# Patient Record
Sex: MALE | Race: BLACK OR AFRICAN AMERICAN | Employment: FULL TIME | URBAN - METROPOLITAN AREA
[De-identification: names, ages, dates, MRNs, and addresses within clinical notes are randomized per-mention and may not be internally consistent; named-entity substitution may affect disease eponyms.]

---

## 2018-05-22 ENCOUNTER — OFFICE VISIT (OUTPATIENT)
Dept: FAMILY MEDICINE CLINIC | Age: 43
End: 2018-05-22

## 2018-05-22 VITALS
HEART RATE: 76 BPM | WEIGHT: 253 LBS | BODY MASS INDEX: 33.53 KG/M2 | DIASTOLIC BLOOD PRESSURE: 84 MMHG | HEIGHT: 73 IN | SYSTOLIC BLOOD PRESSURE: 118 MMHG

## 2018-05-22 DIAGNOSIS — Z13.220 SCREENING CHOLESTEROL LEVEL: ICD-10-CM

## 2018-05-22 DIAGNOSIS — Z00.00 WELL ADULT EXAM: Primary | ICD-10-CM

## 2018-05-22 DIAGNOSIS — M79.675 GREAT TOE PAIN, LEFT: ICD-10-CM

## 2018-05-22 DIAGNOSIS — E66.09 CLASS 1 OBESITY DUE TO EXCESS CALORIES WITHOUT SERIOUS COMORBIDITY WITH BODY MASS INDEX (BMI) OF 33.0 TO 33.9 IN ADULT: ICD-10-CM

## 2018-05-22 DIAGNOSIS — M54.31 SCIATICA OF RIGHT SIDE: ICD-10-CM

## 2018-05-22 PROBLEM — E66.811 CLASS 1 OBESITY DUE TO EXCESS CALORIES WITHOUT SERIOUS COMORBIDITY WITH BODY MASS INDEX (BMI) OF 33.0 TO 33.9 IN ADULT: Status: ACTIVE | Noted: 2018-05-22

## 2018-05-22 LAB
A/G RATIO: 1.7 (ref 1.1–2.2)
ALBUMIN SERPL-MCNC: 4.8 G/DL (ref 3.4–5)
ALP BLD-CCNC: 80 U/L (ref 40–129)
ALT SERPL-CCNC: 17 U/L (ref 10–40)
ANION GAP SERPL CALCULATED.3IONS-SCNC: 21 MMOL/L (ref 3–16)
AST SERPL-CCNC: 20 U/L (ref 15–37)
BILIRUB SERPL-MCNC: 0.4 MG/DL (ref 0–1)
BUN BLDV-MCNC: 11 MG/DL (ref 7–20)
CALCIUM SERPL-MCNC: 9.5 MG/DL (ref 8.3–10.6)
CHLORIDE BLD-SCNC: 97 MMOL/L (ref 99–110)
CHOLESTEROL, TOTAL: 299 MG/DL (ref 0–199)
CO2: 23 MMOL/L (ref 21–32)
CREAT SERPL-MCNC: 1.1 MG/DL (ref 0.9–1.3)
GFR AFRICAN AMERICAN: >60
GFR NON-AFRICAN AMERICAN: >60
GLOBULIN: 2.8 G/DL
GLUCOSE BLD-MCNC: 68 MG/DL (ref 70–99)
HDLC SERPL-MCNC: 42 MG/DL (ref 40–60)
LDL CHOLESTEROL CALCULATED: ABNORMAL MG/DL
LDL CHOLESTEROL DIRECT: 190 MG/DL
POTASSIUM SERPL-SCNC: 4 MMOL/L (ref 3.5–5.1)
SODIUM BLD-SCNC: 141 MMOL/L (ref 136–145)
TOTAL PROTEIN: 7.6 G/DL (ref 6.4–8.2)
TRIGL SERPL-MCNC: 430 MG/DL (ref 0–150)
URIC ACID, SERUM: 6.7 MG/DL (ref 3.5–7.2)
VLDLC SERPL CALC-MCNC: ABNORMAL MG/DL

## 2018-05-22 PROCEDURE — 36415 COLL VENOUS BLD VENIPUNCTURE: CPT | Performed by: FAMILY MEDICINE

## 2018-05-22 PROCEDURE — 99386 PREV VISIT NEW AGE 40-64: CPT | Performed by: FAMILY MEDICINE

## 2018-05-22 RX ORDER — CYCLOBENZAPRINE HCL 10 MG
10 TABLET ORAL 3 TIMES DAILY PRN
Qty: 20 TABLET | Refills: 0 | Status: CANCELLED | OUTPATIENT
Start: 2018-05-22 | End: 2018-06-01

## 2018-05-22 RX ORDER — METHYLPREDNISOLONE 4 MG/1
TABLET ORAL
Qty: 21 TABLET | Refills: 0 | Status: SHIPPED | OUTPATIENT
Start: 2018-05-22 | End: 2018-05-28

## 2018-05-22 RX ORDER — CYCLOBENZAPRINE HCL 10 MG
10 TABLET ORAL 3 TIMES DAILY PRN
Qty: 20 TABLET | Refills: 0 | Status: SHIPPED | OUTPATIENT
Start: 2018-05-22 | End: 2018-06-01

## 2018-05-22 ASSESSMENT — ENCOUNTER SYMPTOMS
RHINORRHEA: 0
SHORTNESS OF BREATH: 0
EYE PAIN: 0
COUGH: 0
ABDOMINAL PAIN: 0
COLOR CHANGE: 0
SORE THROAT: 0
VOMITING: 0
NAUSEA: 0
DIARRHEA: 0
CONSTIPATION: 0

## 2018-05-22 ASSESSMENT — PATIENT HEALTH QUESTIONNAIRE - PHQ9
1. LITTLE INTEREST OR PLEASURE IN DOING THINGS: 0
2. FEELING DOWN, DEPRESSED OR HOPELESS: 0
SUM OF ALL RESPONSES TO PHQ9 QUESTIONS 1 & 2: 0
SUM OF ALL RESPONSES TO PHQ QUESTIONS 1-9: 0

## 2019-08-02 ENCOUNTER — OFFICE VISIT (OUTPATIENT)
Dept: FAMILY MEDICINE CLINIC | Age: 44
End: 2019-08-02
Payer: COMMERCIAL

## 2019-08-02 VITALS
HEART RATE: 73 BPM | SYSTOLIC BLOOD PRESSURE: 128 MMHG | WEIGHT: 255 LBS | OXYGEN SATURATION: 98 % | BODY MASS INDEX: 33.8 KG/M2 | HEIGHT: 73 IN | DIASTOLIC BLOOD PRESSURE: 76 MMHG

## 2019-08-02 DIAGNOSIS — M54.50 ACUTE LEFT-SIDED LOW BACK PAIN WITHOUT SCIATICA: Primary | ICD-10-CM

## 2019-08-02 PROCEDURE — 99213 OFFICE O/P EST LOW 20 MIN: CPT | Performed by: FAMILY MEDICINE

## 2019-08-02 RX ORDER — PREDNISONE 10 MG/1
10 TABLET ORAL 2 TIMES DAILY
Qty: 10 TABLET | Refills: 0 | Status: SHIPPED | OUTPATIENT
Start: 2019-08-02 | End: 2019-08-07

## 2019-08-02 RX ORDER — CYCLOBENZAPRINE HCL 10 MG
10 TABLET ORAL 3 TIMES DAILY PRN
Qty: 15 TABLET | Refills: 1 | Status: SHIPPED | OUTPATIENT
Start: 2019-08-02 | End: 2019-08-12

## 2019-08-02 ASSESSMENT — PATIENT HEALTH QUESTIONNAIRE - PHQ9
SUM OF ALL RESPONSES TO PHQ QUESTIONS 1-9: 0
SUM OF ALL RESPONSES TO PHQ9 QUESTIONS 1 & 2: 0
2. FEELING DOWN, DEPRESSED OR HOPELESS: 0
SUM OF ALL RESPONSES TO PHQ QUESTIONS 1-9: 0
1. LITTLE INTEREST OR PLEASURE IN DOING THINGS: 0

## 2020-01-31 ENCOUNTER — TELEPHONE (OUTPATIENT)
Dept: FAMILY MEDICINE CLINIC | Age: 45
End: 2020-01-31

## 2020-01-31 NOTE — TELEPHONE ENCOUNTER
Samara from [facility]. [facility] 's calling about this patients medical records. The form is scanned in media.     Please fax them to  [phone]    Or call Samara for any questions 21 [phone]   ext: [phone]

## 2020-08-27 NOTE — PROGRESS NOTES
Chief Complaint   Patient presents with    Annual Exam     Family History   Problem Relation Age of Onset    Heart Defect Mother 39     Social History     Socioeconomic History    Marital status:      Spouse name: Jc Douglas Number of children: 2    Years of education: Not on file    Highest education level: Not on file   Occupational History    Occupation:      Employer: [employer]. Social Needs    Financial resource strain: Not on file    Food insecurity     Worry: Not on file     Inability: Not on file    Transportation needs     Medical: Not on file     Non-medical: Not on file   Tobacco Use    Smoking status: Never Smoker    Smokeless tobacco: Never Used   Substance and Sexual Activity    Alcohol use: No    Drug use: No    Sexual activity: Yes     Partners: Female   Lifestyle    Physical activity     Days per week: Not on file     Minutes per session: Not on file    Stress: Not on file   Relationships    Social connections     Talks on phone: Not on file     Gets together: Not on file     Attends Jewish service: Not on file     Active member of club or organization: Not on file     Attends meetings of clubs or organizations: Not on file     Relationship status: Not on file    Intimate partner violence     Fear of current or ex partner: Not on file     Emotionally abused: Not on file     Physically abused: Not on file     Forced sexual activity: Not on file   Other Topics Concern    Not on file   Social History Narrative    Not on file     No current outpatient medications on file.   No Known Allergies    Patient Active Problem List   Diagnosis    Class 1 obesity due to excess calories without serious comorbidity with body mass index (BMI) of 33.0 to 33.9 in adult       HPI / ROS: Griffin Nunes presents for evaluation and management of :    # Preventive and other issues  # Lipids - recent screening history:  Lab Results   Component Value Date    LDLCALC see below 05/22/2018     Lab Results   Component Value Date    TRIG 430 (H) 05/22/2018     Lab Results   Component Value Date    HDL 42 05/22/2018     Lab Results   Component Value Date    CHOL 299 (H) 05/22/2018     Lab Results   Component Value Date    LABA1C 5.8 08/28/2020     No results found for: EAG    # PSA screening history:  No results found for: PSA, PSADIA     # High TGs screen diabetes    # Obesity reviewed with pt lower carb diet with carb targets to help with insulin effects on weight storage; resources advised\        Wt Readings from Last 3 Encounters:   08/28/20 256 lb (116.1 kg)   08/02/19 255 lb (115.7 kg)   05/22/18 253 lb (114.8 kg)         A&o  /83   Pulse 75   Resp 15   Ht 6' 1\" (1.854 m)   Wt 256 lb (116.1 kg)   SpO2 95%   BMI 33.78 kg/m²   Neck no bruit no TMg  Car reg no MGR  Lungs cta  Ext no edema       Diagnosis Orders   1. Routine general medical examination at a health care facility     2. Screening, lipid  Lipid Panel   3. Screening for malignant neoplasm of prostate  Psa screening   4.  Hypertriglyceridemia  POCT glycosylated hemoglobin (Hb A1C)    a1c r/o DM     Orders Placed This Encounter   Procedures    Lipid Panel     Order Specific Question:   Is Patient Fasting?/# of Hours     Answer:   yes    Psa screening    POCT glycosylated hemoglobin (Hb A1C)

## 2020-08-28 ENCOUNTER — OFFICE VISIT (OUTPATIENT)
Dept: FAMILY MEDICINE CLINIC | Age: 45
End: 2020-08-28
Payer: COMMERCIAL

## 2020-08-28 ENCOUNTER — TELEPHONE (OUTPATIENT)
Dept: FAMILY MEDICINE CLINIC | Age: 45
End: 2020-08-28

## 2020-08-28 VITALS
HEIGHT: 73 IN | SYSTOLIC BLOOD PRESSURE: 121 MMHG | BODY MASS INDEX: 33.93 KG/M2 | OXYGEN SATURATION: 95 % | DIASTOLIC BLOOD PRESSURE: 83 MMHG | RESPIRATION RATE: 15 BRPM | WEIGHT: 256 LBS | HEART RATE: 75 BPM

## 2020-08-28 LAB
CHOLESTEROL, TOTAL: 256 MG/DL (ref 0–199)
HBA1C MFR BLD: 5.8 %
HDLC SERPL-MCNC: 37 MG/DL (ref 40–60)
LDL CHOLESTEROL CALCULATED: 184 MG/DL
PROSTATE SPECIFIC ANTIGEN: 0.46 NG/ML (ref 0–4)
TRIGL SERPL-MCNC: 174 MG/DL (ref 0–150)
VLDLC SERPL CALC-MCNC: 35 MG/DL

## 2020-08-28 PROCEDURE — 99396 PREV VISIT EST AGE 40-64: CPT | Performed by: FAMILY MEDICINE

## 2020-08-28 PROCEDURE — 83036 HEMOGLOBIN GLYCOSYLATED A1C: CPT | Performed by: FAMILY MEDICINE

## 2020-08-28 PROCEDURE — 36415 COLL VENOUS BLD VENIPUNCTURE: CPT | Performed by: FAMILY MEDICINE

## 2020-08-28 NOTE — PATIENT INSTRUCTIONS
1) Wheat Belly by Brain Fernandez MD ([email or web address])  2) Living Paleo For Dummies  3) The Primal Blueprint by Juany Jenkins    Www. Pin digital - review success stories    target < 100 grams of carb daily; ZERO grained based carbs    Additional Tip :  Intermittent Fasting / Eating Window  Only eat between noon and 7 PM

## 2020-11-09 ENCOUNTER — VIRTUAL VISIT (OUTPATIENT)
Dept: FAMILY MEDICINE CLINIC | Age: 45
End: 2020-11-09
Payer: COMMERCIAL

## 2020-11-09 ENCOUNTER — OFFICE VISIT (OUTPATIENT)
Dept: PRIMARY CARE CLINIC | Age: 45
End: 2020-11-09
Payer: COMMERCIAL

## 2020-11-09 PROCEDURE — 99213 OFFICE O/P EST LOW 20 MIN: CPT | Performed by: FAMILY MEDICINE

## 2020-11-09 PROCEDURE — 99211 OFF/OP EST MAY X REQ PHY/QHP: CPT | Performed by: NURSE PRACTITIONER

## 2020-11-09 NOTE — PROGRESS NOTES
Deon Quinonez received a viral test for COVID-19. They were educated on isolation and quarantine as appropriate. For any symptoms, they were directed to seek care from their PCP, given contact information to establish with a doctor, directed to an urgent care or the emergency room.

## 2020-11-09 NOTE — PROGRESS NOTES
TELEHEALTH EVALUATION -- Audio/Visual (During HSURD-52 public health emergency)    Helena Maya is a 54 y.o. male being evaluated by a Virtual Visit (video visit) encounter to address concerns as mentioned above. A caregiver was present when appropriate. Due to this being a TeleHealth encounter (During MULJP-19 public health emergency), evaluation of the following organ systems was limited: Vitals/Constitutional/EENT/Resp/CV/GI//MS/Neuro/Skin/Heme-Lymph-Imm. Pursuant to the emergency declaration under the [facility], [address] [place] [address] authority and the [employer] and [employer] Act, this Virtual Visit was conducted with patient's (and/or legal guardian's) consent, to reduce the patient's risk of exposure to COVID-19 and provide necessary medical care. The patient (and/or legal guardian) has also been advised to contact this office for worsening conditions or problems, and seek emergency medical treatment and/or call 911 if deemed necessary. Services were provided through a video synchronous discussion virtually to substitute for in-person clinic visit. Patient and provider were located at their individual homes. --Carrillo Velasco MD on 11/9/2020 at 11:26 AM    An electronic signature was used to authenticate this note. No chief complaint on file. Family History   Problem Relation Age of Onset    Heart Defect Mother 39     Social History     Socioeconomic History    Marital status:      Spouse name: Gemini Rodriguez Number of children: 2    Years of education: Not on file    Highest education level: Not on file   Occupational History    Occupation:      Employer: [employer].    Social Needs    Financial resource strain: Not on file    Food insecurity     Worry: Not on file     Inability: Not on file    Transportation needs     Medical: Not on file     Non-medical: Not on file   Tobacco Use    Smoking status: Never Smoker    Smokeless tobacco: Never Used   Substance and Sexual Activity    Alcohol use: No    Drug use: No    Sexual activity: Yes     Partners: Female   Lifestyle    Physical activity     Days per week: Not on file     Minutes per session: Not on file    Stress: Not on file   Relationships    Social connections     Talks on phone: Not on file     Gets together: Not on file     Attends Religion service: Not on file     Active member of club or organization: Not on file     Attends meetings of clubs or organizations: Not on file     Relationship status: Not on file    Intimate partner violence     Fear of current or ex partner: Not on file     Emotionally abused: Not on file     Physically abused: Not on file     Forced sexual activity: Not on file   Other Topics Concern    Not on file   Social History Narrative    Not on file     No current outpatient medications on file. No Known Allergies    Patient Active Problem List   Diagnosis    Class 1 obesity due to excess calories without serious comorbidity with body mass index (BMI) of 33.0 to 33.9 in adult       HPI / ROS: Alan Lal presents for evaluation and management of :    # 2 day ago had chills and sweats and had pounding headache. Still has those symptoms. Has lost sense of smell and taste. Tempt no fever. Has cough. Not SOB. \"Just feels bad\"      Wt Readings from Last 3 Encounters:   08/28/20 256 lb (116.1 kg)   08/02/19 255 lb (115.7 kg)   05/22/18 253 lb (114.8 kg)       PE : virtual visit     Diagnosis Orders   1. Suspected COVID-19 virus infection      reviewed expecvtations and need t test he will go to viral clinci call for worsening     No orders of the defined types were placed in this encounter.

## 2020-11-11 ENCOUNTER — TELEPHONE (OUTPATIENT)
Dept: FAMILY MEDICINE CLINIC | Age: 45
End: 2020-11-11

## 2020-11-11 LAB — SARS-COV-2, NAA: DETECTED

## 2020-11-11 NOTE — TELEPHONE ENCOUNTER
Let pt know Dr. Tom Savage is out today, but we will leave message for him tomorrow to see where he can be fit in with Dr. Tom Savage.     This document but keep encounter open so Dr. Tom Savage sees us tomorrow

## 2020-11-11 NOTE — TELEPHONE ENCOUNTER
PT informed per Dr Guido Espinoza    PT states that symptoms have been going on for 3-4 days. Pain intensifies at night. PT states tylenol and ibuprofen is not helping.  Anything PT can try today/tonight until Dr Tia Sheriff is back in the office

## 2020-11-11 NOTE — TELEPHONE ENCOUNTER
PT called in stating that he has been experiencing pain in his back. He says that if he lays on his stomach then he gets a burning sensation in stomach that travels to his back once he tries to sleep on his back and he says it feels like a tightening feeling in his back, not a burning sensation that he gets in his stomach when he tries to lay on his stomach.      Please call PT back: [phone]

## 2020-11-12 NOTE — TELEPHONE ENCOUNTER
PT called back in regarding message. Informed PT of Dr. Thelma Modi note. He says he will try the Omeprazole first and if need be he will schedule a VV for a different day.

## 2020-11-30 ENCOUNTER — TELEPHONE (OUTPATIENT)
Dept: FAMILY MEDICINE CLINIC | Age: 45
End: 2020-11-30

## 2020-11-30 NOTE — TELEPHONE ENCOUNTER
PT called in stating that he tested positive for COVID on 11/14/2020 and he's been on short term disability in the meantime. Pt says that he's still not feeling completely better so he wanted to discuss about his short term disability paperwork and when he is able to return to work.      Please call PT back: [phone]

## 2020-11-30 NOTE — TELEPHONE ENCOUNTER
PT called back in regarding message. PT is scheduled for a VV with Dr. Ileana Parks next Wednesday 12/9 @ 8:45.

## 2020-12-01 ENCOUNTER — VIRTUAL VISIT (OUTPATIENT)
Dept: FAMILY MEDICINE CLINIC | Age: 45
End: 2020-12-01
Payer: COMMERCIAL

## 2020-12-01 PROCEDURE — 99213 OFFICE O/P EST LOW 20 MIN: CPT | Performed by: FAMILY MEDICINE

## 2020-12-01 NOTE — PROGRESS NOTES
TELEHEALTH EVALUATION -- Audio/Visual (During [facility])    Dharmesh Low is a 54 y.o. male being evaluated by a Virtual Visit (video visit) encounter to address concerns as mentioned above. A caregiver was present when appropriate. Due to this being a TeleHealth encounter (During [facility] public health emergency), evaluation of the following organ systems was limited: Vitals/Constitutional/EENT/Resp/CV/GI//MS/Neuro/Skin/Heme-Lymph-Imm. Pursuant to the emergency declaration under the [address] [place] [address], [address] [place] [address] authority and the [employer] and [employer] Act, this Virtual Visit was conducted with patient's (and/or legal guardian's) consent, to reduce the patient's risk of exposure to COVID-19 and provide necessary medical care. The patient (and/or legal guardian) has also been advised to contact this office for worsening conditions or problems, and seek emergency medical treatment and/or call 911 if deemed necessary. Services were provided through a video synchronous discussion virtually to substitute for in-person clinic visit. Patient and provider were located at their individual homes. --Ofelia Sosa MD on 12/1/2020 at 10:19 AM    An electronic signature was used to authenticate this note. No chief complaint on file. Family History   Problem Relation Age of Onset    Heart Defect Mother 39     Social History     Socioeconomic History    Marital status:      Spouse name: Peter Maria Number of children: 2    Years of education: Not on file    Highest education level: Not on file   Occupational History    Occupation:      Employer: [employer].    Social Needs    Financial resource strain: Not on file    Food insecurity     Worry: Not on file     Inability: Not on file    Transportation needs     Medical: Not on file     Non-medical: Not on file   Tobacco Use    Smoking status: Never Smoker    Smokeless tobacco: Never Used   Substance and Sexual Activity    Alcohol use: No    Drug use: No    Sexual activity: Yes     Partners: Female   Lifestyle    Physical activity     Days per week: Not on file     Minutes per session: Not on file    Stress: Not on file   Relationships    Social connections     Talks on phone: Not on file     Gets together: Not on file     Attends Restorationist service: Not on file     Active member of club or organization: Not on file     Attends meetings of clubs or organizations: Not on file     Relationship status: Not on file    Intimate partner violence     Fear of current or ex partner: Not on file     Emotionally abused: Not on file     Physically abused: Not on file     Forced sexual activity: Not on file   Other Topics Concern    Not on file   Social History Narrative    Not on file     No current outpatient medications on file. No Known Allergies    Patient Active Problem List   Diagnosis    Class 1 obesity due to excess calories without serious comorbidity with body mass index (BMI) of 33.0 to 33.9 in adult       HPI / ROS: Jacky Hennessy presents for evaluation and management of :    # COVID recovery - he feels fine just off and on taste and smell. No more fever and feeling better just intermittent smell/tatse which may take awhile. Still some fatigue still. Wt Readings from Last 3 Encounters:   08/28/20 256 lb (116.1 kg)   08/02/19 255 lb (115.7 kg)   05/22/18 253 lb (114.8 kg)       PE : virtual visit     Diagnosis Orders   1. COVID-19 virus infection      OK to return to work full duties next week. Rough course. Add more walking 30 minutes /day - get strength back. Letter provided. No orders of the defined types were placed in this encounter.

## 2020-12-16 ENCOUNTER — TELEPHONE (OUTPATIENT)
Dept: FAMILY MEDICINE CLINIC | Age: 45
End: 2020-12-16

## 2020-12-16 NOTE — TELEPHONE ENCOUNTER
[employer] called stating that they only received the fax cover sheet regarding PT's short term disability. Please refax to: [phone].     Best call back number: [phone] ext: [phone]

## 2021-12-29 ENCOUNTER — OFFICE VISIT (OUTPATIENT)
Dept: FAMILY MEDICINE CLINIC | Age: 46
End: 2021-12-29
Payer: COMMERCIAL

## 2021-12-29 VITALS
HEIGHT: 73 IN | BODY MASS INDEX: 35.63 KG/M2 | HEART RATE: 80 BPM | WEIGHT: 268.8 LBS | DIASTOLIC BLOOD PRESSURE: 86 MMHG | OXYGEN SATURATION: 98 % | SYSTOLIC BLOOD PRESSURE: 132 MMHG | RESPIRATION RATE: 16 BRPM

## 2021-12-29 DIAGNOSIS — R10.13 EPIGASTRIC PAIN: Primary | ICD-10-CM

## 2021-12-29 PROCEDURE — 93000 ELECTROCARDIOGRAM COMPLETE: CPT | Performed by: FAMILY MEDICINE

## 2021-12-29 PROCEDURE — 99213 OFFICE O/P EST LOW 20 MIN: CPT | Performed by: FAMILY MEDICINE

## 2021-12-29 SDOH — ECONOMIC STABILITY: FOOD INSECURITY: WITHIN THE PAST 12 MONTHS, THE FOOD YOU BOUGHT JUST DIDN'T LAST AND YOU DIDN'T HAVE MONEY TO GET MORE.: NEVER TRUE

## 2021-12-29 SDOH — ECONOMIC STABILITY: FOOD INSECURITY: WITHIN THE PAST 12 MONTHS, YOU WORRIED THAT YOUR FOOD WOULD RUN OUT BEFORE YOU GOT MONEY TO BUY MORE.: NEVER TRUE

## 2021-12-29 ASSESSMENT — SOCIAL DETERMINANTS OF HEALTH (SDOH): HOW HARD IS IT FOR YOU TO PAY FOR THE VERY BASICS LIKE FOOD, HOUSING, MEDICAL CARE, AND HEATING?: NOT HARD AT ALL

## 2021-12-29 ASSESSMENT — PATIENT HEALTH QUESTIONNAIRE - PHQ9
2. FEELING DOWN, DEPRESSED OR HOPELESS: 0
1. LITTLE INTEREST OR PLEASURE IN DOING THINGS: 0
SUM OF ALL RESPONSES TO PHQ QUESTIONS 1-9: 0
SUM OF ALL RESPONSES TO PHQ QUESTIONS 1-9: 0
SUM OF ALL RESPONSES TO PHQ9 QUESTIONS 1 & 2: 0
SUM OF ALL RESPONSES TO PHQ QUESTIONS 1-9: 0

## 2021-12-29 NOTE — LETTER
[address], [address] [address]  Phone: [phone]  Fax: [phone]    Lazaro Madden MD        December 29, 2021     Patient: Heaven Enamorado   YOB: 1975   Date of Visit: 12/29/2021       To Whom It May Concern: It is my medical opinion that Heaven Enamorado may return to work on 12/30/2021. If you have any questions or concerns, please don't hesitate to call.     Sincerely,        Lazaro Madden MD

## 2021-12-29 NOTE — PROGRESS NOTES
2021    Cameron Dubois (:  1975) is a 51 y.o. male, here for evaluation of the following chief complaint(s):  Abdominal Pain (epigastric x 2 days sharp)       ASSESSMENT/PLAN:     Diagnosis Orders   1. Epigastric pain  EKG 12 lead    EKG 12 lead    US GALLBLADDER RUQ    EKG normal pain resolved curious about biliary colic check us       No follow-ups on file. An electronic signature was used to authenticate this note. @SIG@    SUBJECTIVE/OBJECTIVE:  (NOTE : prior results listed below reviewed at this visit to assist in medical decision making.)    HPI / ROS    # walkin epigastric pain felt this AM called off work.  Sharp steady pain for few minutes        Wt Readings from Last 3 Encounters:   21 268 lb 12.8 oz (121.9 kg)   20 256 lb (116.1 kg)   19 255 lb (115.7 kg)       BP Readings from Last 3 Encounters:   21 132/86   20 121/83   19 128/76       PHYSICAL EXAM  Vitals:    21 0846   BP: 132/86   Site: Left Upper Arm   Position: Sitting   Cuff Size: Large Adult   Pulse: 80   Resp: 16   SpO2: 98%   Weight: 268 lb 12.8 oz (121.9 kg)   Height: 6' 1\" (1.854 m)     A&o  Neck no TMG no bruit  Car reg no MGR  Lungs cta  Ext no edema  Skin no jaundice  Eyes anicteric    EKG - no acute

## 2022-01-03 ENCOUNTER — TELEPHONE (OUTPATIENT)
Dept: FAMILY MEDICINE CLINIC | Age: 47
End: 2022-01-03

## 2022-01-03 NOTE — TELEPHONE ENCOUNTER
Pt called in wanting to be covid tested. Pt has sore throat, cough. Please advise once orders are in.  Pt is reachable at [phone]

## 2022-01-03 NOTE — TELEPHONE ENCOUNTER
Pt is calling back. States that he has called a few different places and also went to [facility] and [facility]. States that he is not able to find a home covid test. Asking if one can be ordered. Please advise.

## 2022-01-03 NOTE — TELEPHONE ENCOUNTER
[facility] will call when he gets home to schedule a VV for Tuesday.

## 2022-01-14 ENCOUNTER — OFFICE VISIT (OUTPATIENT)
Dept: FAMILY MEDICINE CLINIC | Age: 47
End: 2022-01-14
Payer: COMMERCIAL

## 2022-01-14 VITALS
SYSTOLIC BLOOD PRESSURE: 122 MMHG | DIASTOLIC BLOOD PRESSURE: 76 MMHG | RESPIRATION RATE: 16 BRPM | OXYGEN SATURATION: 96 % | HEART RATE: 82 BPM | HEIGHT: 73 IN | WEIGHT: 265 LBS | BODY MASS INDEX: 35.12 KG/M2

## 2022-01-14 DIAGNOSIS — Z00.00 ROUTINE GENERAL MEDICAL EXAMINATION AT A HEALTH CARE FACILITY: Primary | ICD-10-CM

## 2022-01-14 DIAGNOSIS — Z12.5 SCREENING PSA (PROSTATE SPECIFIC ANTIGEN): ICD-10-CM

## 2022-01-14 DIAGNOSIS — Z12.11 SCREEN FOR COLON CANCER: ICD-10-CM

## 2022-01-14 DIAGNOSIS — Z13.220 SCREENING, LIPID: ICD-10-CM

## 2022-01-14 LAB
CHOLESTEROL, TOTAL: 248 MG/DL (ref 0–199)
HDLC SERPL-MCNC: 35 MG/DL (ref 40–60)
LDL CHOLESTEROL CALCULATED: 155 MG/DL
PROSTATE SPECIFIC ANTIGEN: 0.6 NG/ML (ref 0–4)
TRIGL SERPL-MCNC: 292 MG/DL (ref 0–150)
VLDLC SERPL CALC-MCNC: 58 MG/DL

## 2022-01-14 PROCEDURE — 36415 COLL VENOUS BLD VENIPUNCTURE: CPT | Performed by: FAMILY MEDICINE

## 2022-01-14 PROCEDURE — 99396 PREV VISIT EST AGE 40-64: CPT | Performed by: FAMILY MEDICINE

## 2022-01-14 NOTE — PROGRESS NOTES
2022    Karla Castano (:  1975) is a 51 y.o. male, here for evaluation of the following chief complaint(s):  Discuss Labs (Colonoscopy)      ASSESSMENT/PLAN:     Diagnosis Orders   1. Routine general medical examination at a health care facility     2. Screening PSA (prostate specific antigen)  PSA screening   3. Screening, lipid  Lipid Panel   4. Screen for colon cancer  KELSY - Ramial Herzog MD, Gastroenterology, [facility] [place]       Return in about 1 year (around 2023). An electronic signature was used to authenticate this note.     @SIG@    SUBJECTIVE/OBJECTIVE:  (NOTE : prior results listed below reviewed at this visit to assist in medical decision making.)    HPI / ROS    # Preventive and other issues  # PSA screening history:  Lab Results   Component Value Date    PSA 0.46 2020     # Lipids - recent screening history:  Lab Results   Component Value Date    LDLCALC 184 (H) 2020     Lab Results   Component Value Date    TRIG 174 (H) 2020     Lab Results   Component Value Date    HDL 37 (L) 2020     Lab Results   Component Value Date    CHOL 256 (H) 2020     # screen colon cancer - screening status discussed with patient; he will d/w GI screen and poss GI      # recent COVID infection recovered      Wt Readings from Last 3 Encounters:   22 265 lb (120.2 kg)   21 268 lb 12.8 oz (121.9 kg)   20 256 lb (116.1 kg)       BP Readings from Last 3 Encounters:   22 122/76   21 132/86   20 121/83       PHYSICAL EXAM  Vitals:    22 0938   BP: 122/76   Site: Left Upper Arm   Position: Sitting   Cuff Size: Large Adult   Pulse: 82   Resp: 16   SpO2: 96%   Weight: 265 lb (120.2 kg)   Height: 6' 1\" (1.854 m)     A&o  Neck no TMG no bruit  Car reg no MGR  Lungs cta  Ext no edema  Skin no jaundice  Eyes anicteric

## 2022-10-13 ENCOUNTER — OFFICE VISIT (OUTPATIENT)
Dept: FAMILY MEDICINE CLINIC | Age: 47
End: 2022-10-13
Payer: COMMERCIAL

## 2022-10-13 VITALS
WEIGHT: 274.8 LBS | OXYGEN SATURATION: 98 % | HEART RATE: 77 BPM | DIASTOLIC BLOOD PRESSURE: 94 MMHG | BODY MASS INDEX: 36.42 KG/M2 | RESPIRATION RATE: 16 BRPM | SYSTOLIC BLOOD PRESSURE: 134 MMHG | HEIGHT: 73 IN

## 2022-10-13 DIAGNOSIS — Z12.11 SCREENING FOR COLON CANCER: Primary | ICD-10-CM

## 2022-10-13 DIAGNOSIS — K21.9 GASTROESOPHAGEAL REFLUX DISEASE WITHOUT ESOPHAGITIS: ICD-10-CM

## 2022-10-13 PROCEDURE — 99213 OFFICE O/P EST LOW 20 MIN: CPT | Performed by: NURSE PRACTITIONER

## 2022-10-13 RX ORDER — OMEPRAZOLE 20 MG/1
20 CAPSULE, DELAYED RELEASE ORAL
Qty: 30 CAPSULE | Refills: 5 | Status: SHIPPED | OUTPATIENT
Start: 2022-10-13

## 2022-10-13 ASSESSMENT — PATIENT HEALTH QUESTIONNAIRE - PHQ9
SUM OF ALL RESPONSES TO PHQ QUESTIONS 1-9: 0
2. FEELING DOWN, DEPRESSED OR HOPELESS: 0
1. LITTLE INTEREST OR PLEASURE IN DOING THINGS: 0
SUM OF ALL RESPONSES TO PHQ QUESTIONS 1-9: 0
SUM OF ALL RESPONSES TO PHQ9 QUESTIONS 1 & 2: 0
SUM OF ALL RESPONSES TO PHQ QUESTIONS 1-9: 0
SUM OF ALL RESPONSES TO PHQ QUESTIONS 1-9: 0

## 2022-10-13 NOTE — PROGRESS NOTES
PROGRESS NOTE     Mikaela Soliz [facility] Physicians  [address] [address]  [phone] office  [phone] fax    Date of Service:  10/13/2022    Assessment / Plan:     1. Screening for colon cancer  Patient due for screening colonoscopy. Referral placed. - Bernarda Sanchez MD, Gastroenterology, [facility]    2. Gastroesophageal reflux disease without esophagitis  Symptoms most consistent with GERD. Will trial omeprazole. Notify office if symptoms do not resolve. - omeprazole (PRILOSEC) 20 MG delayed release capsule; Take 1 capsule by mouth every morning (before breakfast)  Dispense: 30 capsule; Refill: 5    HM: Declines flu vaccine. Subjective:      Patient ID: . Garth Woodard is a 49 y.o. male      CC: Heartburn    HPI  Acute GI Symptoms:  Patient complains of a few week history of heartburn- occurring occasionally . Symptoms have been worsening with time. Associated symptoms include none. Patient denies any other symptoms. Symptoms are exacerbated by coffee and spicy foods. Prior history of similar symptoms: no.  Relevant PMH: none. New exposures: none. Therapy to date: none. Prior diagnostic testing: none. Vitals:    10/13/22 1305   BP: (!) 134/94   Site: Left Upper Arm   Position: Sitting   Cuff Size: Large Adult   Pulse: 77   Resp: 16   SpO2: 98%   Weight: 274 lb 12.8 oz (124.6 kg)   Height: 6' 1\" (1.854 m)       Outpatient Medications Marked as Taking for the 10/13/22 encounter (Office Visit) with TRACY Castillo - CNP   Medication Sig Dispense Refill    omeprazole (PRILOSEC) 20 MG delayed release capsule Take 1 capsule by mouth every morning (before breakfast) 30 capsule 5       Past Medical History:   Diagnosis Date    Class 1 obesity due to excess calories without serious comorbidity with body mass index (BMI) of 33.0 to 33.9 in adult 5/22/2018       No past surgical history on file.     Social History     Tobacco Use Smoking status: Never    Smokeless tobacco: Never   Substance Use Topics    Alcohol use: No       Family History   Problem Relation Age of Onset    Heart Defect Mother 39           Review of Systems  Constitutional:  Negative for activity or appetite change, fever or fatigue  HENT:  Negative for congestion,sinus pressure, or rhinorrhea  Eyes:  Negative for eye pain or visual changes  Resp:  Negative for SOB, chest tightness, cough  Cardiovascular: Negative for CP, palpitations, DOYLE, orthopnea, PND, LE edema  Gastrointestinal: Negative for abd pain, melena, BRBPR, N/V/D. + indigestion  Endocrine:  Negative for polydipsia and polyuria  :  Negative for dysuria, flank pain or urinary frequency  Musculoskeletal:  Negative for back pain or myalgias  Neuro:  Negative for dizziness or lightheadedness  Psych: negative for depression or anxiety      Objective:   Constitutional:   Reviewed vitals above  Well Nourished, well developed, no distress       HENT:  Normal external nose without lesions  Neck:  Symmetric and without masses  Resp:  Normal effort  Clear to auscultation bilaterally without rhonchi, wheezing or crackles  Cardiovascular:   On auscultation, normal S1 and S2 without murmurs, rubs or gallops  Gastrointestinal:  Nontender, nondistended, and no masses  No hepatosplenomegaly  Musculoskeletal:  Normal Gait  All extremities without clubbing, cyanosis or edema  Skin:  No rashes on inspection  No areas of increased heat or induration on palpation  Psych:  Normal mood and affect  Normal insight and judgement

## 2022-10-13 NOTE — PATIENT INSTRUCTIONS
[address] Donahue MD  [address] [place] [address] Drive., [address] [place] [address], [address] [address]  Phone: [phone]  Fax: [phone]

## 2024-01-29 DIAGNOSIS — K21.9 GASTROESOPHAGEAL REFLUX DISEASE WITHOUT ESOPHAGITIS: ICD-10-CM

## 2024-01-30 NOTE — TELEPHONE ENCOUNTER
Medication:   Requested Prescriptions     Pending Prescriptions Disp Refills    omeprazole (PRILOSEC) 20 MG delayed release capsule [Pharmacy Med Name: Omeprazole Oral Capsule Delayed Release 20 MG] 30 capsule 0     Sig: Take 1 capsule by mouth every morning (before breakfast)       Last Filled:  10/13/2022    Patient Phone Number: [phone] (home)     Last appt: 10/13/2022   Next appt: Visit date not found

## 2024-01-30 NOTE — TELEPHONE ENCOUNTER
Called and notified pt that he will need an appointment before  can refill his medication. Pt states an understanding and will call back to schedule appointment.

## 2024-01-31 RX ORDER — OMEPRAZOLE 20 MG/1
20 CAPSULE, DELAYED RELEASE ORAL
Qty: 30 CAPSULE | Refills: 0 | OUTPATIENT
Start: 2024-01-31

## 2024-02-29 ENCOUNTER — OFFICE VISIT (OUTPATIENT)
Dept: FAMILY MEDICINE CLINIC | Age: 49
End: 2024-02-29
Payer: COMMERCIAL

## 2024-02-29 VITALS
BODY MASS INDEX: 35.52 KG/M2 | DIASTOLIC BLOOD PRESSURE: 74 MMHG | WEIGHT: 268 LBS | HEIGHT: 73 IN | HEART RATE: 85 BPM | SYSTOLIC BLOOD PRESSURE: 118 MMHG | OXYGEN SATURATION: 96 %

## 2024-02-29 DIAGNOSIS — K21.9 GASTROESOPHAGEAL REFLUX DISEASE WITHOUT ESOPHAGITIS: ICD-10-CM

## 2024-02-29 DIAGNOSIS — Z12.5 SCREENING PSA (PROSTATE SPECIFIC ANTIGEN): ICD-10-CM

## 2024-02-29 DIAGNOSIS — Z00.00 ROUTINE GENERAL MEDICAL EXAMINATION AT A HEALTH CARE FACILITY: Primary | ICD-10-CM

## 2024-02-29 DIAGNOSIS — Z13.220 SCREENING, LIPID: ICD-10-CM

## 2024-02-29 DIAGNOSIS — Z13.1 SCREENING FOR DIABETES MELLITUS: ICD-10-CM

## 2024-02-29 LAB
CHOLEST SERPL-MCNC: 257 MG/DL (ref 0–199)
HDLC SERPL-MCNC: 32 MG/DL (ref 40–60)
LDLC SERPL CALC-MCNC: 188 MG/DL
PSA SERPL DL<=0.01 NG/ML-MCNC: 0.81 NG/ML (ref 0–4)
TRIGL SERPL-MCNC: 187 MG/DL (ref 0–150)
VLDLC SERPL CALC-MCNC: 37 MG/DL

## 2024-02-29 PROCEDURE — 99396 PREV VISIT EST AGE 40-64: CPT | Performed by: FAMILY MEDICINE

## 2024-02-29 RX ORDER — OMEPRAZOLE 20 MG/1
20 CAPSULE, DELAYED RELEASE ORAL
Qty: 90 CAPSULE | Refills: 3 | Status: SHIPPED | OUTPATIENT
Start: 2024-02-29

## 2024-02-29 SDOH — ECONOMIC STABILITY: HOUSING INSECURITY
IN THE LAST 12 MONTHS, WAS THERE A TIME WHEN YOU DID NOT HAVE A STEADY PLACE TO SLEEP OR SLEPT IN A SHELTER (INCLUDING NOW)?: NO

## 2024-02-29 SDOH — ECONOMIC STABILITY: FOOD INSECURITY: WITHIN THE PAST 12 MONTHS, THE FOOD YOU BOUGHT JUST DIDN'T LAST AND YOU DIDN'T HAVE MONEY TO GET MORE.: NEVER TRUE

## 2024-02-29 SDOH — ECONOMIC STABILITY: INCOME INSECURITY: HOW HARD IS IT FOR YOU TO PAY FOR THE VERY BASICS LIKE FOOD, HOUSING, MEDICAL CARE, AND HEATING?: NOT HARD AT ALL

## 2024-02-29 SDOH — ECONOMIC STABILITY: FOOD INSECURITY: WITHIN THE PAST 12 MONTHS, YOU WORRIED THAT YOUR FOOD WOULD RUN OUT BEFORE YOU GOT MONEY TO BUY MORE.: NEVER TRUE

## 2024-02-29 ASSESSMENT — PATIENT HEALTH QUESTIONNAIRE - PHQ9
2. FEELING DOWN, DEPRESSED OR HOPELESS: 0
1. LITTLE INTEREST OR PLEASURE IN DOING THINGS: 0
SUM OF ALL RESPONSES TO PHQ QUESTIONS 1-9: 0
SUM OF ALL RESPONSES TO PHQ QUESTIONS 1-9: 0
SUM OF ALL RESPONSES TO PHQ9 QUESTIONS 1 & 2: 0
SUM OF ALL RESPONSES TO PHQ QUESTIONS 1-9: 0
SUM OF ALL RESPONSES TO PHQ QUESTIONS 1-9: 0

## 2024-02-29 NOTE — PROGRESS NOTES
2024    Reji Jimenez (:  1975) is a 48 y.o. male, here for evaluation of the following chief complaint(s):  Skin Problem (Albaro ear/ sides of face itching x3 weeks. Pt starts new job and he was put into an office and then his face has been itching ever since. )      ASSESSMENT/PLAN:     Diagnosis Orders   1. Routine general medical examination at a health care facility        2. Screening, lipid  Lipid Panel      3. Screening PSA (prostate specific antigen)  PSA Screening      4. Screening for diabetes mellitus  Hemoglobin A1C      5. Gastroesophageal reflux disease without esophagitis  omeprazole (PRILOSEC) 20 MG delayed release capsule    OK PPI cont          Return in about 1 year (around 2025) for [facility].    An electronic signature was used to authenticate this note.    SUBJECTIVE/OBJECTIVE:  (NOTE : prior results listed below reviewed at this visit to assist in medical decision making.)    HPI / ROS    # Preventive and other issues  # PSA screening history:  Lab Results   Component Value Date    PSA 0.60 2022    PSA 0.46 2020     # Lipids - recent screening history:  Lab Results   Component Value Date    LDLCALC 155 (H) 2022     Lab Results   Component Value Date    TRIG 292 (H) 2022     Lab Results   Component Value Date    HDL 35 (L) 2022     Lab Results   Component Value Date    CHOL 248 (H) 2022     # Screen for diabetes  Hemoglobin A1C   Date Value Ref Range Status   2020 5.8 % Final             Wt Readings from Last 3 Encounters:   24 121.6 kg (268 lb)   10/13/22 124.6 kg (274 lb 12.8 oz)   22 120.2 kg (265 lb)       BP Readings from Last 3 Encounters:   24 118/74   10/13/22 (!) 134/94   22 122/76       PHYSICAL EXAM  Vitals:    24   BP: 118/74   Pulse: 85   SpO2: 96%   Weight: 121.6 kg (268 lb)   Height: 1.854 m (6' 1\")     A&o  Neck no TMG no bruit  Car reg no MGR  Lungs cta  Ext no edema  Skin no

## 2024-03-01 LAB
EST. AVERAGE GLUCOSE BLD GHB EST-MCNC: 137 MG/DL
HBA1C MFR BLD: 6.4 %

## 2024-04-03 ENCOUNTER — TELEPHONE (OUTPATIENT)
Dept: FAMILY MEDICINE CLINIC | Age: 49
End: 2024-04-03

## 2024-04-03 RX ORDER — MONTELUKAST SODIUM 10 MG/1
10 TABLET ORAL NIGHTLY
Qty: 30 TABLET | Refills: 3 | Status: SHIPPED | OUTPATIENT
Start: 2024-04-03

## 2024-04-03 NOTE — TELEPHONE ENCOUNTER
Called Reji and clarified, states was told to get OTC cortisone and it has not helped his face. Its not worst but not any better either.

## 2024-04-03 NOTE — TELEPHONE ENCOUNTER
Pt called stating he saw Dr Cuevas and was given a script for itching. Pt states he is still itching, it has not helped.Please advise. Pt is reachable at [phone]

## 2024-09-05 ENCOUNTER — OFFICE VISIT (OUTPATIENT)
Dept: FAMILY MEDICINE CLINIC | Age: 49
End: 2024-09-05
Payer: COMMERCIAL

## 2024-09-05 VITALS
HEART RATE: 85 BPM | HEIGHT: 73 IN | RESPIRATION RATE: 18 BRPM | WEIGHT: 249 LBS | DIASTOLIC BLOOD PRESSURE: 68 MMHG | OXYGEN SATURATION: 97 % | SYSTOLIC BLOOD PRESSURE: 116 MMHG | BODY MASS INDEX: 33 KG/M2

## 2024-09-05 DIAGNOSIS — U07.1 COVID: Primary | ICD-10-CM

## 2024-09-05 PROCEDURE — 99214 OFFICE O/P EST MOD 30 MIN: CPT | Performed by: FAMILY MEDICINE

## 2024-09-05 RX ORDER — PREDNISONE 10 MG/1
10 TABLET ORAL 2 TIMES DAILY
Qty: 10 TABLET | Refills: 0 | Status: SHIPPED | OUTPATIENT
Start: 2024-09-05 | End: 2024-09-10

## 2024-09-05 ASSESSMENT — PATIENT HEALTH QUESTIONNAIRE - PHQ9
1. LITTLE INTEREST OR PLEASURE IN DOING THINGS: NOT AT ALL
SUM OF ALL RESPONSES TO PHQ9 QUESTIONS 1 & 2: 0
2. FEELING DOWN, DEPRESSED OR HOPELESS: NOT AT ALL
SUM OF ALL RESPONSES TO PHQ QUESTIONS 1-9: 0

## 2024-09-05 NOTE — PROGRESS NOTES
2024    Reji Jimenez (:  1975) is a 48 y.o. male, here for evaluation of the following chief complaint(s):  Headache (Started  with headache, body aches, sore throat, sweating- sore throat has subsided)      ASSESSMENT/PLAN:     Diagnosis Orders   1. COVID      we discuss quarantine x 5 mask x 5 more not Paxlovid candidate; prednisone for sore throat; rest          Return if symptoms worsen or fail to improve.    An electronic signature was used to authenticate this note.    SUBJECTIVE/OBJECTIVE:  (NOTE : prior results listed below reviewed at this visit to assist in medical decision making.)    HPI / ROS    # WALK IN - acute for 5 days headache sore throat.  Brought COVID home test with him. In Office it is positive  Breathing OK smiling talking        Wt Readings from Last 3 Encounters:   24 112.9 kg (249 lb)   24 121.6 kg (268 lb)   10/13/22 124.6 kg (274 lb 12.8 oz)       BP Readings from Last 3 Encounters:   24 116/68   24 118/74   10/13/22 (!) 134/94       PHYSICAL EXAM  Vitals:    24 0812   BP: 116/68   Pulse: 85   Resp: 18   SpO2: 97%   Weight: 112.9 kg (249 lb)   Height: 1.854 m (6' 1\")     A&o  Thr nl

## 2024-10-08 ENCOUNTER — OFFICE VISIT (OUTPATIENT)
Dept: FAMILY MEDICINE CLINIC | Age: 49
End: 2024-10-08
Payer: COMMERCIAL

## 2024-10-08 VITALS
WEIGHT: 261 LBS | DIASTOLIC BLOOD PRESSURE: 86 MMHG | OXYGEN SATURATION: 97 % | BODY MASS INDEX: 34.59 KG/M2 | HEART RATE: 71 BPM | HEIGHT: 73 IN | RESPIRATION RATE: 18 BRPM | SYSTOLIC BLOOD PRESSURE: 128 MMHG

## 2024-10-08 DIAGNOSIS — K21.9 GASTROESOPHAGEAL REFLUX DISEASE, UNSPECIFIED WHETHER ESOPHAGITIS PRESENT: Primary | ICD-10-CM

## 2024-10-08 PROCEDURE — G2211 COMPLEX E/M VISIT ADD ON: HCPCS | Performed by: FAMILY MEDICINE

## 2024-10-08 PROCEDURE — 99213 OFFICE O/P EST LOW 20 MIN: CPT | Performed by: FAMILY MEDICINE

## 2024-10-08 SDOH — ECONOMIC STABILITY: INCOME INSECURITY: HOW HARD IS IT FOR YOU TO PAY FOR THE VERY BASICS LIKE FOOD, HOUSING, MEDICAL CARE, AND HEATING?: NOT HARD AT ALL

## 2024-10-08 SDOH — ECONOMIC STABILITY: FOOD INSECURITY: WITHIN THE PAST 12 MONTHS, THE FOOD YOU BOUGHT JUST DIDN'T LAST AND YOU DIDN'T HAVE MONEY TO GET MORE.: NEVER TRUE

## 2024-10-08 SDOH — ECONOMIC STABILITY: FOOD INSECURITY: WITHIN THE PAST 12 MONTHS, YOU WORRIED THAT YOUR FOOD WOULD RUN OUT BEFORE YOU GOT MONEY TO BUY MORE.: NEVER TRUE

## 2024-10-08 ASSESSMENT — PATIENT HEALTH QUESTIONNAIRE - PHQ9
2. FEELING DOWN, DEPRESSED OR HOPELESS: NOT AT ALL
SUM OF ALL RESPONSES TO PHQ QUESTIONS 1-9: 0
1. LITTLE INTEREST OR PLEASURE IN DOING THINGS: NOT AT ALL
SUM OF ALL RESPONSES TO PHQ9 QUESTIONS 1 & 2: 0
SUM OF ALL RESPONSES TO PHQ QUESTIONS 1-9: 0

## 2024-10-08 NOTE — PROGRESS NOTES
10/8/2024    Reji Jimenez (:  1975) is a 49 y.o. male, here for evaluation of the following chief complaint(s):  Letter for School/Work (Pt would like to file claim medical opinion of GERD and PTSD is needing a letter for the connection. Requested by [facility] Kayla had dx. )      ASSESSMENT/PLAN:     Diagnosis Orders   1. Gastroesophageal reflux disease, unspecified whether esophagitis present      reviewed PPI use and [facility] doc[facility]ation for [facility]          No follow-ups on file.    An electronic signature was used to authenticate this note.    SUBJECTIVE/OBJECTIVE:  (NOTE : prior results listed below reviewed at this visit to assist in medical decision making.)    HPI / ROS    # GERD - OK on Proton Pump Inhibitor (PPI) per pt; no pain, cough or acid reflux taste; we discuss studies that link PTSD and reflux.        Wt Readings from Last 3 Encounters:   10/08/24 118.4 kg (261 lb)   24 112.9 kg (249 lb)   24 121.6 kg (268 lb)       BP Readings from Last 3 Encounters:   10/08/24 128/86   24 116/68   24 118/74       PHYSICAL EXAM  Vitals:    10/08/24 0845   BP: 128/86   Pulse: 71   Resp: 18   SpO2: 97%   Weight: 118.4 kg (261 lb)   Height: 1.854 m (6' 1\")     A&o

## 2024-11-13 ENCOUNTER — TELEPHONE (OUTPATIENT)
Dept: FAMILY MEDICINE CLINIC | Age: 49
End: 2024-11-13

## 2024-11-13 NOTE — TELEPHONE ENCOUNTER
Pt called in stating that he needed a referral for sleep apnea. He said that he did not know If he needed to come in. He can be reached at [phone]

## 2025-06-30 ENCOUNTER — OFFICE VISIT (OUTPATIENT)
Dept: FAMILY MEDICINE CLINIC | Age: 50
End: 2025-06-30
Payer: COMMERCIAL

## 2025-06-30 VITALS
RESPIRATION RATE: 18 BRPM | HEART RATE: 86 BPM | BODY MASS INDEX: 34.72 KG/M2 | SYSTOLIC BLOOD PRESSURE: 146 MMHG | WEIGHT: 262 LBS | OXYGEN SATURATION: 97 % | HEIGHT: 73 IN | DIASTOLIC BLOOD PRESSURE: 90 MMHG

## 2025-06-30 DIAGNOSIS — M21.619 BUNION: ICD-10-CM

## 2025-06-30 DIAGNOSIS — G47.33 OSA (OBSTRUCTIVE SLEEP APNEA): Primary | ICD-10-CM

## 2025-06-30 DIAGNOSIS — R03.0 ELEVATED BP WITHOUT DIAGNOSIS OF HYPERTENSION: ICD-10-CM

## 2025-06-30 PROCEDURE — 99213 OFFICE O/P EST LOW 20 MIN: CPT | Performed by: FAMILY MEDICINE

## 2025-06-30 ASSESSMENT — PATIENT HEALTH QUESTIONNAIRE - PHQ9
SUM OF ALL RESPONSES TO PHQ QUESTIONS 1-9: 2
2. FEELING DOWN, DEPRESSED OR HOPELESS: SEVERAL DAYS
1. LITTLE INTEREST OR PLEASURE IN DOING THINGS: SEVERAL DAYS

## 2025-07-07 ENCOUNTER — TELEPHONE (OUTPATIENT)
Dept: FAMILY MEDICINE CLINIC | Age: 50
End: 2025-07-07

## 2025-07-07 NOTE — TELEPHONE ENCOUNTER
Pt came in stating he is checking on the status of his sleep apnea form. Pt stated Dr. Cuevas advised him to follow up if the office didn't reach out to pt. Pt can be reached at [phone].

## 2025-07-08 NOTE — TELEPHONE ENCOUNTER
I am not going to get to that until THURS unfortunately due to unexpected need to be out early today until THURS. PLease let him now he will hear from us THURS and route back to me

## 2025-07-14 NOTE — PROGRESS NOTES
2025    Reji Jimenez (:  1975) is a 49 y.o. male, here for evaluation of the following chief complaint(s):  Foot Problem (Left foot has a bump  for years but is having a sharp in radiating down the great toe. ) and Sleep Apnea (Has sleep study report, would like for you to see it. Had form about PTSD and sleep study. Needs a letter. )      ASSESSMENT/PLAN:     Diagnosis Orders   1. GINA (obstructive sleep apnea)      wrote letter in support tasia with PTSD he has seen [facility] for due to [place] service      2. Luis Fernando      see podiatry [facility]      3. Elevated BP without diagnosis of hypertension      he will follow home cuff and RTC if above 140/90; states normal at outside measurements; treat GINA          Return if symptoms worsen or fail to improve, for follows [facility] cinic as well.    An electronic signature was used to authenticate this note.    SUBJECTIVE/OBJECTIVE:  (NOTE : prior results listed below reviewed at this visit to assist in medical decision making.)    HPI / ROS    2 new issues :    # GINA with hs PTSD - we review literature connecting two done [facility]. Letter written to support this.    # podiatry is available through [facility] and he will be looking into this for luis fernando            Wt Readings from Last 3 Encounters:   25 118.8 kg (262 lb)   10/08/24 118.4 kg (261 lb)   24 112.9 kg (249 lb)       BP Readings from Last 3 Encounters:   25 (!) 146/90   10/08/24 128/86   24 116/68       PHYSICAL EXAM  Vitals:    25 1238 25 1245   BP: (!) 150/92 (!) 146/90   Pulse: 86    Resp: 18    SpO2: 97%    Weight: 118.8 kg (262 lb)    Height: 1.854 m (6' 1\")      A&o  Car reg no MGR  Lungs cta